# Patient Record
Sex: MALE | Race: WHITE | NOT HISPANIC OR LATINO | URBAN - METROPOLITAN AREA
[De-identification: names, ages, dates, MRNs, and addresses within clinical notes are randomized per-mention and may not be internally consistent; named-entity substitution may affect disease eponyms.]

---
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
## 2021-05-30 VITALS — WEIGHT: 165 LBS | BODY MASS INDEX: 22.35 KG/M2 | HEIGHT: 72 IN

## 2021-05-30 VITALS — HEIGHT: 72 IN | WEIGHT: 165 LBS | BODY MASS INDEX: 22.35 KG/M2

## 2021-05-31 VITALS — WEIGHT: 166 LBS | HEIGHT: 72 IN | BODY MASS INDEX: 22.48 KG/M2

## 2021-06-01 VITALS — HEIGHT: 73 IN | WEIGHT: 164 LBS | BODY MASS INDEX: 21.74 KG/M2

## 2021-06-08 NOTE — PROGRESS NOTES
"ASSESSMENT & PLAN:  1. ADHD (attention deficit hyperactivity disorder)  Currently control with Adderall, continue the same, possible side effect discussed.  2. Acne cystica  Control with doxycycline, possible side effect discussed with patient and mother, included dental discoloration, skin photosensitivity  3. Flu vaccine need  - Influenza, Seasonal Quad, Preservative Free 36+ Months    There are no Patient Instructions on file for this visit.    Orders Placed This Encounter   Procedures     Influenza, Seasonal Quad, Preservative Free 36+ Months     Medications Discontinued During This Encounter   Medication Reason     albuterol (PROVENTIL HFA;VENTOLIN HFA) 90 mcg/actuation inhaler Stop Taking at Discharge       No Follow-up on file.    CHIEF COMPLAINT:  Chief Complaint   Patient presents with     Medication Refill     ADDERALL       HISTORY OF PRESENT ILLNESS:  Paramjit is a 16 y.o. male accompanied by his mother presenting to the clinic today for medication refill of Adderall. He does not notice a difference with the Adderall. His mother states she notices a difference if he forgets to take the Adderall. He has a harder time focusing or following through and becomes fidgety. He takes the medication every day, even on the weekends. He is sleeping well.     Health Maintenance: He received the influenza vaccine today.    REVIEW OF SYSTEMS:   He has decreased flare ups of the acne with the doxycycline. He denies side effects from the medication. He takes 1 or 2 pills depending on the stress level during the week. All other systems are negative.    PFSH:  Tobacco Use:  History   Smoking Status     Never Smoker   Smokeless Tobacco     Never Used       VITALS:  Vitals:    01/06/17 1630   BP: 102/58   Patient Site: Right Arm   Pulse: 60   Resp: 13   Temp: 98.1  F (36.7  C)   TempSrc: Oral   Weight: 165 lb (74.8 kg)   Height: 6' 0.2\" (1.834 m)     Wt Readings from Last 3 Encounters:   01/06/17 165 lb (74.8 kg) (81 %, Z= " 0.87)*   11/14/16 167 lb (75.8 kg) (83 %, Z= 0.97)*   11/07/16 159 lb (72.1 kg) (76 %, Z= 0.72)*     * Growth percentiles are based on [facility] 2-20 Years data.     Body mass index is 22.25 kg/(m^2).    PHYSICAL EXAM:  General:  Patient alert, in no acute distress.  Resp:  Clear to auscultation without crackles, wheezes or distress.  Normal respiratory effort.   CV:  Regular rate and rhythm without murmurs, rubs or gallops.  Skin:  Acne is improved, resolving acne lesions in face and neck.  Neuro:  CN II-XII intact  Psychiatric:  Alert & oriented with normal mood and affect.  Good judgment and insight. Thought process and language adequate. Calm and cooperative.    ADDITIONAL HISTORY SUMMARIZED (2): None.  DECISION TO OBTAIN EXTRA INFORMATION (1): None.   RADIOLOGY TESTS (1): None.  LABS (1): None.  MEDICINE TESTS (1): None.  INDEPENDENT REVIEW (2 each): None.     The visit lasted a total of 8 minutes face to face with the patient. Over 50% of the time was spent counseling and educating the patient about Adderall, ADHD, acne, doxycycline.    IPao, am scribing for and in the presence of, Dr. Alves.    I, Dr. Alves, personally performed the services described in this documentation, as scribed by Pao Herrera in my presence, and it is both accurate and complete.    Dragon dictation was used for this note.  Speech recognition errors are a possibility.    MEDICATIONS:  Current Outpatient Prescriptions   Medication Sig Dispense Refill     CETIRIZINE HCL (ZYRTEC ORAL) Take 10 mg by mouth daily as needed.        dextroamphetamine-amphetamine (ADDERALL XR) 15 MG 24 hr capsule Take 1 capsule (15 mg total) by mouth every morning. 30 capsule 0     doxycycline (VIBRAMYCIN) 50 MG capsule Take 1 capsule (50 mg total) by mouth 2 (two) times a day. 180 capsule 0     No current facility-administered medications for this visit.        Total data points: 0

## 2021-06-10 NOTE — PROGRESS NOTES
"ASSESSMENT & PLAN:  1. Acute URI  - Rapid Strep A Screen-Throat  - Group A Strep, RNA Direct Detection, Throat    Patient Instructions   Continue with symptomatic management: Drink plenty of fluids, take plenty of vitamin C, take Tylenol or ibuprofen as needed, gargle with salt water.  Return to clinic if symptoms persist or worsen.      Orders Placed This Encounter   Procedures     Group A Strep, RNA Direct Detection, Throat     There are no discontinued medications.    No Follow-up on file.    CHIEF COMPLAINT:  Chief Complaint   Patient presents with     Sore Throat     X 1 DAYS       HISTORY OF PRESENT ILLNESS:  Jack is a 17 y.o. male presenting to the clinic today complaining of sore throat x 1 day. His mother states his right ear was red when she looked in the ear with an otoscope. He denies ear pain, cough, and fever. He denies ill contacts. He is taking ibuprofen for the sore throat.    REVIEW OF SYSTEMS:   He is traveling to [place] in 2 days. The acne is well controlled. All other systems are negative.    PFSH:  Tobacco Use:  History   Smoking Status     Never Smoker   Smokeless Tobacco     Never Used       VITALS:  Vitals:    04/11/17 1318   BP: 114/70   Patient Site: Right Arm   Pulse: 64   Resp: 12   Temp: 97.5  F (36.4  C)   TempSrc: Oral   Weight: 165 lb (74.8 kg)   Height: 6' 0.3\" (1.836 m)     Wt Readings from Last 3 Encounters:   04/11/17 165 lb (74.8 kg) (79 %, Z= 0.81)*   01/06/17 165 lb (74.8 kg) (81 %, Z= 0.87)*   11/14/16 167 lb (75.8 kg) (83 %, Z= 0.97)*     * Growth percentiles are based on CDC 2-20 Years data.     Body mass index is 22.19 kg/(m^2).    PHYSICAL EXAM:  General:  Patient alert, in no acute distress.   ENT:  Hearing grossly normal.  Normal appearance to ears and nose.  Bilateral TM s, external canals normal.  Minimal erythema of pharynx, no exudate, no tonsillar enlargement.  Neck:  Supple, without thyromegaly or mass, no adenopathies.  Lymphatic: Normal palpation of neck.  No " lymphadenopathy.  No bruising.  Resp:  Clear to auscultation without crackles, wheezes or distress.  Normal respiratory effort.   CV:  Regular rate and rhythm without murmurs, rubs or gallops.   Neuro:  CN II-XII intact.  Psychiatric:  Alert & oriented with normal mood and affect.    ADDITIONAL HISTORY SUMMARIZED (2): None.  DECISION TO OBTAIN EXTRA INFORMATION (1): None.   RADIOLOGY TESTS (1): None.  LABS (1): Labs ordered and reviewed today.  MEDICINE TESTS (1): None.  INDEPENDENT REVIEW (2 each): None.     The visit lasted a total of 9 minutes face to face with the patient. Over 50% of the time was spent counseling and educating the patient about sore throat management.    IPao, am scribing for and in the presence of, Dr. Alves.    I, Dr. Alves, personally performed the services described in this documentation, as scribed by Pao Herrera in my presence, and it is both accurate and complete.    Dragon dictation was used for this note.  Speech recognition errors are a possibility.    MEDICATIONS:  Current Outpatient Prescriptions   Medication Sig Dispense Refill     CETIRIZINE HCL (ZYRTEC ORAL) Take 10 mg by mouth daily as needed.        dextroamphetamine-amphetamine (ADDERALL XR) 15 MG 24 hr capsule Take 1 capsule (15 mg total) by mouth every morning. 30 capsule 0     doxycycline (VIBRAMYCIN) 50 MG capsule Take 1 capsule (50 mg total) by mouth 2 (two) times a day. 180 capsule 0     No current facility-administered medications for this visit.        Total data points: 1

## 2021-06-13 NOTE — PROGRESS NOTES
[facility] Well Child Check    ASSESSMENT & PLAN  Paramjit Fulton is a 17  y.o. 6  m.o. who has normal growth and normal development.    Diagnoses and all orders for this visit:    Visit for preventive health examination    ADHD (attention deficit hyperactivity disorder)  -     dextroamphetamine-amphetamine (ADDERALL XR) 15 MG 24 hr capsule; Take 1 capsule (15 mg total) by mouth daily.  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  Possible long-term effect of stimulant medication use discussed, also advised patient's father  to check compatibility of Aviation school with stimulant use.  Return to clinic in 1 year for a Well Child Check or sooner as needed    IMMUNIZATIONS/LABS  Immunizations were reviewed and orders were placed as appropriate.    REFERRALS  Dental:  Recommend routine dental care as appropriate.  Other:  No additional referrals were made at this time.    ANTICIPATORY GUIDANCE  I have reviewed age appropriate anticipatory guidance.    HEALTH HISTORY  Do you have any concerns that you'd like to discuss today?: NO CONCERNS      Refills needed? No    Do you have any forms that need to be filled out? No        Do you have any significant health concerns in your family history?: No  No family history on file.  Since your last visit, have there been any major changes in your family, such as a move, job change, separation, divorce, or death in the family?: No    Home  Who lives in your home?:  MOM/DAD/BROTHER/2 CATS/DOG/BIRD  Social History     Social History Narrative     Do you have any trouble with sleep?:  No    Education  What school does your child attend?:  [employer]   What grade is your child in?:  12th  How does the patient perform in school (grades, behavior, attention, homework?: NO     Eating  Does  "patient eat regular meals including fruits and vegetables?:  yes  What is the patient drinking (cow's milk, water, soda, juice, sports drinks, energy drinks, etc)?: cow's milk- whole and juice  Does patient have concerns about body or appearance?:  No    Activities  Does the patient have friends?:  no  Does the patient get at least one hour of physical activity per day?:  yes  Does the patient have less than 2 hours of screen time per day (aside from homework)?:  yes  What does your child do for exercise?:  Ski   Does the patient have interest/participate in community activities/volunteers/school sports?:  SOCCER/SKIING    MENTAL HEALTH SCREENING  PHQ-2 Total Score: 0 (1/6/2017  4:00 PM)  No Data Recorded    VISION/HEARING  Vision: Completed. See Results  Hearing:  Completed. See Results     Hearing Screening    125Hz 250Hz 500Hz 1000Hz 2000Hz 3000Hz 4000Hz 6000Hz 8000Hz   Right ear:   20 20 20  20     Left ear:   20 20 20  20        Visual Acuity Screening    Right eye Left eye Both eyes   Without correction:      With correction: 10/10 10/10 10/10       TB Risk Assessment:  The patient and/or parent/guardian answer positive to:  patient and/or parent/guardian answer 'no' to all screening TB questions    Dental  Is your child being seen by a dentist?  Yes  Is child seen by dentist?     Yes    Patient Active Problem List   Diagnosis     ADHD (attention deficit hyperactivity disorder)     Acne cystica       Drugs  Does the patient use tobacco/alcohol/drugs?:  no    Safety  Does the patient have any safety concerns (peer or home)?:  no  Does the patient use safety belts, helmets and other safety equipment?:  no    Sex  Is the patient sexually active?:  no    MEASUREMENTS  Height:  6' 0.4\" (1.839 m)  Weight: 166 lb (75.3 kg)  BMI: Body mass index is 22.27 kg/(m^2).  Blood Pressure: 118/58  Blood pressure percentiles are 33 % systolic and 13 % diastolic based on NHBPEP's 4th Report. Blood pressure percentile targets: " 90: 136/86, 95: 140/90, 99 + 5 mmH/103.    PHYSICAL EXAM  Physical Exam   Constitutional: He is oriented to person, place, and time and well-developed, well-nourished, and in no distress. He appears well-developed and well-nourished.   HENT:   Head: Normocephalic and atraumatic.   Right Ear: External ear normal.   Mouth/Throat: Oropharynx is clear and moist. No oropharyngeal exudate.   Eyes: Conjunctivae and EOM are normal. Pupils are equal, round, and reactive to light. Right eye exhibits no discharge. No scleral icterus.   Neck: Normal range of motion. Neck supple. No JVD present. No tracheal deviation present. No thyromegaly present.   Cardiovascular: Normal rate and regular rhythm.  Exam reveals no gallop and no friction rub.    No murmur heard.  Pulmonary/Chest: Effort normal and breath sounds normal. No respiratory distress. He has no wheezes. He has no rales. He exhibits no tenderness.   Abdominal: Soft. Bowel sounds are normal. He exhibits no distension and no mass. There is no tenderness. There is no rebound and no guarding. Hernia confirmed negative in the right inguinal area and confirmed negative in the left inguinal area.   Genitourinary: Testes normal and penis normal. Right testis shows no mass. Left testis shows no mass.   Musculoskeletal: Normal range of motion. He exhibits no edema or tenderness.   Lymphadenopathy:     He has no cervical adenopathy.        Right: No inguinal adenopathy present.        Left: No inguinal adenopathy present.   Neurological: He is alert and oriented to person, place, and time. He has normal reflexes. No cranial nerve deficit. He exhibits normal muscle tone. Gait normal. Coordination normal.   Skin: Skin is warm. No rash noted. No erythema. No pallor.   Psychiatric: He has a normal mood and affect. Mood, memory, affect and judgment normal.

## 2021-06-16 NOTE — PROGRESS NOTES
"OFFICE VISIT - FAMILY MEDICINE     ASSESSMENT AND PLAN     1. ADHD (attention deficit hyperactivity disorder)     Patient was admitted at the [facility] aeronautic program to be a , he does have history of ADHD currently managed with Adderall, but only using during the week days, not on the weekend, we've discussed about non chemical management of his symptoms, we've also discussed about a plan on tapering off Adderall from 15 mg a day to 10 mg daily for 1 week then 5 mg daily for another week and then stop continue to work with a psychotherapy, follow-up if there is any new issues.  More than  50% of this 20 minutes visit was spent in counseling and coordination of care.    CHIEF COMPLAINT   Medication Education Visit (\"DISCUSS STOPPING ADDERALL\")    HPI   Paramjit Fulton is a 17 y.o. male.  Updated MIIC    Patient is accompanied by dad, stating that he was just recently [facility] with plan to come out as a .  Does have ADHD has been on Adderall since second grade, as part of the requirement before entering that program, then she will not be on any stimulant medication.   Patient is here to discuss about alternative treatment to ADHD without stimulant.  Patient has been only using his medication during school days, and he only takes it from Monday through Friday, off medication during the weekend and spring,summer and fall breaks season.    Review of Systems As per HPI, otherwise negative.    OBJECTIVE   /62 (Patient Site: Left Arm)  Pulse 68  Temp 98.1  F (36.7  C) (Oral)   Resp 13  Ht 6' 0.5\" (1.842 m)  Wt 164 lb (74.4 kg)  BMI 21.94 kg/m2  Physical Exam   Constitutional: He appears well-developed and well-nourished.   Psychiatric: He has a normal mood and affect. Judgment and thought content normal.       PFSH   No family history on file.  Social History     Social History     Marital status: Single     Spouse name: N/A     " Number of children: N/A     Years of education: N/A     Occupational History     Not on file.     Social History Main Topics     Smoking status: Never Smoker     Smokeless tobacco: Never Used     Alcohol use Not on file     Drug use: Not on file     Sexual activity: Not on file     Other Topics Concern     Not on file     Social History Narrative     Relevant history was reviewed with the patient today, unless noted in HPI, nothing is pertinent for this visit.  [facility]     Patient Active Problem List    Diagnosis Date Noted     Acne cystica 08/26/2016     ADHD (attention deficit hyperactivity disorder) 11/11/2015     No past surgical history on file.    RESULTS/CONSULTS (Lab/Rad)   No results found for this or any previous visit (from the past 168 hour(s)).  No results found.  MEDICATIONS     Current Outpatient Prescriptions on File Prior to Visit   Medication Sig Dispense Refill     CETIRIZINE HCL (ZYRTEC ORAL) Take 10 mg by mouth daily as needed.        dextroamphetamine-amphetamine (ADDERALL XR) 15 MG 24 hr capsule Take 1 capsule (15 mg total) by mouth daily. 30 capsule 0     No current facility-administered medications on file prior to visit.        HEALTH MAINTENANCE / SCREENING   PHQ-2 Total Score: 0 (2/14/2018  3:15 PM), No Data Recorded,No Data Recorded  Immunization History   Administered Date(s) Administered     DTaP, historic 2000, 2000, 2000, 06/26/2001, 08/09/2005     Dtap 2000, 2000, 2000, 06/26/2001, 08/09/2005     HPV Quadrivalent 07/03/2012, 09/04/2012, 01/10/2013     Hep B, historic 2000, 2000, 2000     Hepatitis A, Ped/Adol 2 Dose IM (18yr & under) 09/25/2017     HiB, historic,unspecified 2000, 2000, 2000, 06/26/2001     Hib (PRP-OMP) 2000, 2000, 2000, 06/26/2001     IPV 08/09/2005     Influenza, Live, Nasal LAIV3 08/22/2012     Influenza, Seasonal, Inj PF IIV3 02/09/2007, 11/09/2007, 09/25/2009, 09/16/2010      Influenza, inj, historic,unspecified 11/23/2005, 02/09/2007, 11/09/2007, 09/25/2009, 09/16/2010, 10/21/2011     Influenza, nasal, historic 08/22/2012, 09/09/2013, 11/03/2014     Influenza,live, Nasal Laiv4 09/09/2013, 11/03/2014, 11/11/2015     Influenza,seasonal quad, PF, 36+MOS 01/06/2017, 09/25/2017     Influenza,seasonal, Inj IIV3 11/23/2005, 10/21/2011     MMR 06/26/2001, 08/09/2005     Meningococcal B (PF) 12/01/2017     Meningococcal MCV4O 07/23/2014     Meningococcal MPSV4, SQ 07/23/2014     Tdap 07/03/2012     Varicella 06/26/2001, 07/03/2012     Health Maintenance   Topic     IPV VACCINES (2 of 4 - All-OPV Series)     HEPATITIS A VACCINES (2 of 2 - Standard Series)     HEARING SCREEN      VISION SCREENING      DTAP/TDAP/TD (7 - Td)     HEPATITIS B VACCINES      MMR VACCINES      VARICELLA VACCINES      MENINGOCOCCAL VACCINE      HPV VACCINES      INFLUENZA VACCINE RULE BASED        Donna Alves MD  Family Medicine, [facility]     This note was dictated using a voice recognition software.  Any grammatical or context distortion are unintentional and inherent to the software.

## 2021-08-21 ENCOUNTER — HEALTH MAINTENANCE LETTER (OUTPATIENT)
Age: 21
End: 2021-08-21

## 2021-10-16 ENCOUNTER — HEALTH MAINTENANCE LETTER (OUTPATIENT)
Age: 21
End: 2021-10-16

## 2022-09-25 ENCOUNTER — HEALTH MAINTENANCE LETTER (OUTPATIENT)
Age: 22
End: 2022-09-25

## 2023-10-14 ENCOUNTER — HEALTH MAINTENANCE LETTER (OUTPATIENT)
Age: 23
End: 2023-10-14
